# Patient Record
Sex: FEMALE | Race: WHITE | HISPANIC OR LATINO | URBAN - METROPOLITAN AREA
[De-identification: names, ages, dates, MRNs, and addresses within clinical notes are randomized per-mention and may not be internally consistent; named-entity substitution may affect disease eponyms.]

---

## 2020-06-01 ENCOUNTER — TELEPHONE ENCOUNTER (OUTPATIENT)
Dept: URBAN - METROPOLITAN AREA CLINIC 35 | Facility: CLINIC | Age: 40
End: 2020-06-01

## 2020-06-18 ENCOUNTER — TELEPHONE ENCOUNTER (OUTPATIENT)
Dept: URBAN - METROPOLITAN AREA CLINIC 35 | Facility: CLINIC | Age: 40
End: 2020-06-18

## 2020-06-18 ENCOUNTER — OFFICE VISIT (OUTPATIENT)
Dept: URBAN - METROPOLITAN AREA CLINIC 35 | Facility: CLINIC | Age: 40
End: 2020-06-18

## 2020-06-18 VITALS
SYSTOLIC BLOOD PRESSURE: 100 MMHG | DIASTOLIC BLOOD PRESSURE: 68 MMHG | BODY MASS INDEX: 25.03 KG/M2 | HEIGHT: 62 IN | WEIGHT: 136 LBS | TEMPERATURE: 98.6 F

## 2020-06-18 RX ORDER — OMEPRAZOLE 40 MG/1
CAPSULE, DELAYED RELEASE PELLETS ORAL
Status: ACTIVE | COMMUNITY

## 2020-06-18 RX ORDER — DEXTROMETHORPHAN POLISTIREX 30 MG/5 ML
1 CAPSULE SUSPENSION, EXTENDED RELEASE 12 HR ORAL ONCE A DAY
Qty: 30 | Status: ACTIVE | COMMUNITY

## 2020-06-18 RX ORDER — DICYCLOMINE HYDROCHLORIDE 10 MG/1
1 CAPSULE AS NEEDED CAPSULE ORAL THREE TIMES A DAY
Qty: 30 CAPSULE | Refills: 2 | OUTPATIENT
Start: 2020-06-18

## 2020-06-18 RX ORDER — CIPROFLOXACIN HCL 500 MG
1 TABLET TABLET ORAL
Qty: 20 | Status: ON HOLD | COMMUNITY

## 2020-06-18 RX ORDER — SODIUM, POTASSIUM,MAG SULFATES 17.5-3.13G
ML AS DIRECTED SOLUTION, RECONSTITUTED, ORAL ORAL
Qty: 1 KIT | Refills: 0 | OUTPATIENT
Start: 2020-06-18

## 2020-06-18 RX ORDER — METRONIDAZOLE 500 MG/1
1 TABLET TABLET, FILM COATED ORAL THREE TIMES A DAY
Qty: 42 TABLET | Status: ON HOLD | COMMUNITY

## 2020-06-18 NOTE — HPI-MIGRATED HPI
;     Blood in stool :                Patient presents today for a consultation about rectal bleeding.  Bleeding happened one time 2 weeks ago , followed by severe abdominal pain.  The blood was bright in color .  Patient currently admits 1 bowel movements per day.  She denies any mucus, melena, pruritus ani, rectal pain.  Patient denies a previous colonoscopy.  Patient went to her PCP first for evaluation they prescribed Cipro and Flagyl which she finished  ;

## 2020-06-24 ENCOUNTER — OFFICE VISIT (OUTPATIENT)
Dept: URBAN - METROPOLITAN AREA CLINIC 35 | Facility: CLINIC | Age: 40
End: 2020-06-24

## 2020-06-30 ENCOUNTER — OFFICE VISIT (OUTPATIENT)
Dept: URBAN - METROPOLITAN AREA SURGERY CENTER 8 | Facility: SURGERY CENTER | Age: 40
End: 2020-06-30

## 2020-07-10 ENCOUNTER — OFFICE VISIT (OUTPATIENT)
Dept: URBAN - METROPOLITAN AREA CLINIC 35 | Facility: CLINIC | Age: 40
End: 2020-07-10

## 2020-07-10 VITALS
HEART RATE: 87 BPM | HEIGHT: 62 IN | TEMPERATURE: 97.9 F | SYSTOLIC BLOOD PRESSURE: 116 MMHG | WEIGHT: 132 LBS | BODY MASS INDEX: 24.29 KG/M2 | OXYGEN SATURATION: 97 % | DIASTOLIC BLOOD PRESSURE: 72 MMHG

## 2020-07-10 PROBLEM — 92076000 BENIGN NEOPLASM OF DESCENDING COLON: Status: ACTIVE | Noted: 2020-07-10

## 2020-07-10 PROBLEM — 2901004 MELENA: Status: ACTIVE | Noted: 2020-06-18

## 2020-07-10 PROBLEM — 102614006 GENERALIZED ABDOMINAL PAIN: Status: ACTIVE | Noted: 2020-06-19

## 2020-07-10 PROBLEM — 16331000 HEARTBURN: Status: ACTIVE | Noted: 2020-06-19

## 2020-07-10 RX ORDER — CIPROFLOXACIN HCL 500 MG
1 TABLET TABLET ORAL
Qty: 20 | Status: DISCONTINUED | COMMUNITY

## 2020-07-10 RX ORDER — DICYCLOMINE HYDROCHLORIDE 10 MG/1
1 CAPSULE AS NEEDED CAPSULE ORAL THREE TIMES A DAY
Qty: 30 CAPSULE | Refills: 2 | Status: ACTIVE | COMMUNITY
Start: 2020-06-18

## 2020-07-10 RX ORDER — METRONIDAZOLE 500 MG/1
1 TABLET TABLET, FILM COATED ORAL THREE TIMES A DAY
Qty: 42 TABLET | Status: DISCONTINUED | COMMUNITY

## 2020-07-10 RX ORDER — DEXTROMETHORPHAN POLISTIREX 30 MG/5 ML
1 CAPSULE SUSPENSION, EXTENDED RELEASE 12 HR ORAL ONCE A DAY
Qty: 30 | Status: ACTIVE | COMMUNITY

## 2020-07-10 RX ORDER — SODIUM, POTASSIUM,MAG SULFATES 17.5-3.13G
ML AS DIRECTED SOLUTION, RECONSTITUTED, ORAL ORAL
Qty: 1 KIT | Refills: 0 | Status: DISCONTINUED | COMMUNITY
Start: 2020-06-18

## 2020-07-10 RX ORDER — OMEPRAZOLE 40 MG/1
CAPSULE, DELAYED RELEASE PELLETS ORAL
Status: ACTIVE | COMMUNITY

## 2020-07-10 NOTE — HPI-MIGRATED HPI
;     Blood in stool : Patient presents today for follow up to her colonoscopy. Patient currently admits 1 bowel movement per day with  normal and formed stools. Patient denies melena, blood, or mucus in stool.  She admits/denies any more episodes of blood in stool since last visit.        Last visit (6/18/2020)             Patient presents today for a consultation about rectal bleeding.  Bleeding happened one time 2 weeks ago , followed by severe abdominal pain.  The blood was bright in color .  Patient currently admits 1 bowel movements per day.  She denies any mucus, melena, pruritus ani, rectal pain.  Patient denies a previous colonoscopy.  Patient went to her PCP first for evaluation they prescribed Cipro and Flagyl which she finished  ;

## 2020-07-10 NOTE — EXAM-MIGRATED EXAMINATIONS
General Examination : medical assistant was in room.;     GENERAL APPEARANCE: - alert, in no acute distress, well developed, well nourished;

## 2020-07-31 ENCOUNTER — OFFICE VISIT (OUTPATIENT)
Dept: URBAN - METROPOLITAN AREA CLINIC 35 | Facility: CLINIC | Age: 40
End: 2020-07-31

## 2022-04-12 ENCOUNTER — OFFICE VISIT (OUTPATIENT)
Dept: URBAN - METROPOLITAN AREA CLINIC 35 | Facility: CLINIC | Age: 42
End: 2022-04-12
Payer: SELF-PAY

## 2022-04-12 ENCOUNTER — LAB OUTSIDE AN ENCOUNTER (OUTPATIENT)
Dept: URBAN - METROPOLITAN AREA CLINIC 35 | Facility: CLINIC | Age: 42
End: 2022-04-12

## 2022-04-12 VITALS
SYSTOLIC BLOOD PRESSURE: 108 MMHG | BODY MASS INDEX: 22.82 KG/M2 | OXYGEN SATURATION: 98 % | HEIGHT: 62 IN | WEIGHT: 124 LBS | DIASTOLIC BLOOD PRESSURE: 72 MMHG | HEART RATE: 71 BPM

## 2022-04-12 DIAGNOSIS — R10.13 EPIGASTRIC PAIN: ICD-10-CM

## 2022-04-12 PROCEDURE — 99214 OFFICE O/P EST MOD 30 MIN: CPT | Performed by: INTERNAL MEDICINE

## 2022-04-12 RX ORDER — DICYCLOMINE HYDROCHLORIDE 10 MG/1
1 CAPSULE AS NEEDED CAPSULE ORAL THREE TIMES A DAY
Qty: 30 CAPSULE | Refills: 2 | Status: ON HOLD | COMMUNITY
Start: 2020-06-18

## 2022-04-12 RX ORDER — OMEPRAZOLE 40 MG/1
CAPSULE, DELAYED RELEASE PELLETS ORAL
Status: ON HOLD | COMMUNITY

## 2022-04-12 RX ORDER — DEXTROMETHORPHAN POLISTIREX 30 MG/5 ML
1 CAPSULE SUSPENSION, EXTENDED RELEASE 12 HR ORAL ONCE A DAY
Qty: 30 | Status: ON HOLD | COMMUNITY

## 2022-04-12 NOTE — HPI-ABDOMINAL PAIN
42 year old female patient presents for abdominal pain consult. Patient admits she has been experiencing symptoms approximately for one month .  Patient describes symptoms as _if someone is twisting her abdomen .  Patient states symptoms are located epigastric pain that radiates to her lower back .   Patient admits symptoms are more present during /after meals more so spicy foods .  Patient denies nausea or vomiting. Patient denies having any recent imaging.  She denies taking any medications at this time to relieve symptoms . She admits taking Advi at times to relieve symptoms .  Patient denies EGD in the past.

## 2022-05-03 ENCOUNTER — OFFICE VISIT (OUTPATIENT)
Dept: URBAN - METROPOLITAN AREA SURGERY CENTER 8 | Facility: SURGERY CENTER | Age: 42
End: 2022-05-03
Payer: SELF-PAY

## 2022-05-03 ENCOUNTER — CLAIMS CREATED FROM THE CLAIM WINDOW (OUTPATIENT)
Dept: URBAN - METROPOLITAN AREA CLINIC 4 | Facility: CLINIC | Age: 42
End: 2022-05-03
Payer: SELF-PAY

## 2022-05-03 DIAGNOSIS — K29.60 ADENOPAPILLOMATOSIS GASTRICA: ICD-10-CM

## 2022-05-03 DIAGNOSIS — R10.13 ABDOMINAL DISCOMFORT, EPIGASTRIC: ICD-10-CM

## 2022-05-03 DIAGNOSIS — K31.89 ACQUIRED DEFORMITY OF DUODENUM: ICD-10-CM

## 2022-05-03 DIAGNOSIS — B96.81 BACTERIAL INFECTION DUE TO H. PYLORI: ICD-10-CM

## 2022-05-03 DIAGNOSIS — B96.81 HELICOBACTER PYLORI [H. PYLORI] AS THE CAUSE OF DISEASES CLASSIFIED ELSEWHERE: ICD-10-CM

## 2022-05-03 DIAGNOSIS — K29.60 OTHER GASTRITIS WITHOUT BLEEDING: ICD-10-CM

## 2022-05-03 DIAGNOSIS — K31.89 FOCAL FOVEOLAR HYPERPLASIA: ICD-10-CM

## 2022-05-03 PROCEDURE — 88305 TISSUE EXAM BY PATHOLOGIST: CPT | Performed by: PATHOLOGY

## 2022-05-03 PROCEDURE — 88312 SPECIAL STAINS GROUP 1: CPT | Performed by: PATHOLOGY

## 2022-05-03 PROCEDURE — 43239 EGD BIOPSY SINGLE/MULTIPLE: CPT | Performed by: INTERNAL MEDICINE

## 2022-05-03 PROCEDURE — G8907 PT DOC NO EVENTS ON DISCHARG: HCPCS | Performed by: INTERNAL MEDICINE

## 2022-05-03 RX ORDER — DEXTROMETHORPHAN POLISTIREX 30 MG/5 ML
1 CAPSULE SUSPENSION, EXTENDED RELEASE 12 HR ORAL ONCE A DAY
Qty: 30 | Status: ON HOLD | COMMUNITY

## 2022-05-03 RX ORDER — DICYCLOMINE HYDROCHLORIDE 10 MG/1
1 CAPSULE AS NEEDED CAPSULE ORAL THREE TIMES A DAY
Qty: 30 CAPSULE | Refills: 2 | Status: ON HOLD | COMMUNITY
Start: 2020-06-18

## 2022-05-03 RX ORDER — OMEPRAZOLE 40 MG/1
CAPSULE, DELAYED RELEASE PELLETS ORAL
Status: ON HOLD | COMMUNITY

## 2022-05-24 ENCOUNTER — OFFICE VISIT (OUTPATIENT)
Dept: URBAN - METROPOLITAN AREA CLINIC 35 | Facility: CLINIC | Age: 42
End: 2022-05-24

## 2022-06-15 ENCOUNTER — CLAIMS CREATED FROM THE CLAIM WINDOW (OUTPATIENT)
Dept: URBAN - METROPOLITAN AREA CLINIC 33 | Facility: CLINIC | Age: 42
End: 2022-06-15
Payer: SELF-PAY

## 2022-06-15 VITALS — HEIGHT: 62 IN | OXYGEN SATURATION: 98 % | BODY MASS INDEX: 22.08 KG/M2 | WEIGHT: 120 LBS | HEART RATE: 76 BPM

## 2022-06-15 DIAGNOSIS — R10.13 EPIGASTRIC PAIN: ICD-10-CM

## 2022-06-15 DIAGNOSIS — B96.81 HELICOBACTER PYLORI [H. PYLORI] AS THE CAUSE OF DISEASES CLASSIFIED ELSEWHERE: ICD-10-CM

## 2022-06-15 PROBLEM — 79922009: Status: ACTIVE | Noted: 2022-06-15

## 2022-06-15 PROBLEM — 80774000 HELICOBACTER PYLORI: Status: ACTIVE | Noted: 2022-06-15

## 2022-06-15 PROCEDURE — 99214 OFFICE O/P EST MOD 30 MIN: CPT | Performed by: INTERNAL MEDICINE

## 2022-06-15 RX ORDER — OMEPRAZOLE 20 MG/1
1 CAPSULE CAPSULE, DELAYED RELEASE ORAL TWICE A DAY
Qty: 28 CAPSULE | Refills: 0 | OUTPATIENT
Start: 2022-06-15

## 2022-06-15 RX ORDER — DICYCLOMINE HYDROCHLORIDE 10 MG/1
1 CAPSULE AS NEEDED CAPSULE ORAL THREE TIMES A DAY
Qty: 30 CAPSULE | Refills: 2 | Status: ON HOLD | COMMUNITY
Start: 2020-06-18

## 2022-06-15 RX ORDER — DEXTROMETHORPHAN POLISTIREX 30 MG/5 ML
1 CAPSULE SUSPENSION, EXTENDED RELEASE 12 HR ORAL ONCE A DAY
Qty: 30 | Status: ON HOLD | COMMUNITY

## 2022-06-15 RX ORDER — OMEPRAZOLE 40 MG/1
CAPSULE, DELAYED RELEASE PELLETS ORAL
Status: ON HOLD | COMMUNITY

## 2022-06-15 RX ORDER — CLARITHROMYCIN 500 MG/1
1 TABLET TABLET, FILM COATED ORAL TWICE A DAY
Qty: 28 TABLET | Refills: 0 | OUTPATIENT
Start: 2022-06-15 | End: 2022-06-29

## 2022-06-15 RX ORDER — AMOXICILLIN 500 MG/1
2 CAPSULES CAPSULE ORAL TWICE A DAY
Qty: 56 CAPSULE | Refills: 0 | OUTPATIENT
Start: 2022-06-15 | End: 2022-06-29

## 2022-06-15 NOTE — HPI-ABDOMINAL PAIN
Patient presents for follow up of abdominal pain,imaging and EGD. Pt admits she is still havig epigastirc pain after meals . She denies taking any medication at this time . She denies nausea or vomiting . She denies any complications after procedure .   Of last visit (04/12/2022)42 year old female patient presents for abdominal pain consult. Patient admits she has been experiencing symptoms approximately for one month .  Patient describes symptoms as _if someone is twisting her abdomen .  Patient states symptoms are located epigastric pain that radiates to her lower back .   Patient admits symptoms are more present during /after meals more so spicy foods .  Patient denies nausea or vomiting. Patient denies having any recent imaging.  She denies taking any medications at this time to relieve symptoms . She admits taking Advi at times to relieve symptoms .  Patient denies EGD in the past.

## 2022-08-11 ENCOUNTER — OFFICE VISIT (OUTPATIENT)
Dept: URBAN - METROPOLITAN AREA CLINIC 35 | Facility: CLINIC | Age: 42
End: 2022-08-11

## 2022-08-11 ENCOUNTER — TELEPHONE ENCOUNTER (OUTPATIENT)
Dept: URBAN - METROPOLITAN AREA CLINIC 36 | Facility: CLINIC | Age: 42
End: 2022-08-11

## 2022-08-11 RX ORDER — DICYCLOMINE HYDROCHLORIDE 10 MG/1
1 CAPSULE AS NEEDED CAPSULE ORAL THREE TIMES A DAY
Qty: 30 CAPSULE | Refills: 2 | Status: ON HOLD | COMMUNITY
Start: 2020-06-18

## 2022-08-11 RX ORDER — OMEPRAZOLE 20 MG/1
1 CAPSULE CAPSULE, DELAYED RELEASE ORAL TWICE A DAY
Qty: 28 CAPSULE | Refills: 0 | Status: ACTIVE | COMMUNITY
Start: 2022-06-15

## 2022-08-11 RX ORDER — OMEPRAZOLE 40 MG/1
CAPSULE, DELAYED RELEASE PELLETS ORAL
Status: ON HOLD | COMMUNITY

## 2022-08-11 RX ORDER — DEXTROMETHORPHAN POLISTIREX 30 MG/5 ML
1 CAPSULE SUSPENSION, EXTENDED RELEASE 12 HR ORAL ONCE A DAY
Qty: 30 | Status: ON HOLD | COMMUNITY

## 2022-08-17 LAB
H PYLORI BREATH TEST: DETECTED
H. PYLORI BREATH TEST: DETECTED
INTERPRETATION: DETECTED

## 2022-08-23 ENCOUNTER — OFFICE VISIT (OUTPATIENT)
Dept: URBAN - METROPOLITAN AREA CLINIC 35 | Facility: CLINIC | Age: 42
End: 2022-08-23
Payer: SELF-PAY

## 2022-08-23 VITALS
DIASTOLIC BLOOD PRESSURE: 71 MMHG | HEART RATE: 78 BPM | SYSTOLIC BLOOD PRESSURE: 111 MMHG | WEIGHT: 121 LBS | HEIGHT: 62 IN | OXYGEN SATURATION: 98 % | BODY MASS INDEX: 22.26 KG/M2

## 2022-08-23 DIAGNOSIS — B96.81 HELICOBACTER PYLORI [H. PYLORI] AS THE CAUSE OF DISEASES CLASSIFIED ELSEWHERE: ICD-10-CM

## 2022-08-23 PROCEDURE — 99214 OFFICE O/P EST MOD 30 MIN: CPT | Performed by: INTERNAL MEDICINE

## 2022-08-23 RX ORDER — DEXTROMETHORPHAN POLISTIREX 30 MG/5 ML
1 CAPSULE SUSPENSION, EXTENDED RELEASE 12 HR ORAL ONCE A DAY
Qty: 30 | Status: ON HOLD | COMMUNITY

## 2022-08-23 RX ORDER — OMEPRAZOLE 40 MG/1
CAPSULE, DELAYED RELEASE PELLETS ORAL
Status: ON HOLD | COMMUNITY

## 2022-08-23 RX ORDER — OMEPRAZOLE MAGNESIUM, AMOXICILLIN AND RIFABUTIN 10; 250; 12.5 MG/1; MG/1; MG/1
4 CAPSULES CAPSULE, DELAYED RELEASE ORAL
Qty: 168 | OUTPATIENT
Start: 2022-08-23 | End: 2022-09-06

## 2022-08-23 RX ORDER — DICYCLOMINE HYDROCHLORIDE 10 MG/1
1 CAPSULE AS NEEDED CAPSULE ORAL THREE TIMES A DAY
Qty: 30 CAPSULE | Refills: 2 | Status: ON HOLD | COMMUNITY
Start: 2020-06-18

## 2022-08-23 RX ORDER — OMEPRAZOLE 20 MG/1
1 CAPSULE CAPSULE, DELAYED RELEASE ORAL TWICE A DAY
Qty: 28 CAPSULE | Refills: 0 | Status: DISCONTINUED | COMMUNITY
Start: 2022-06-15

## 2022-08-25 ENCOUNTER — TELEPHONE ENCOUNTER (OUTPATIENT)
Dept: URBAN - METROPOLITAN AREA CLINIC 36 | Facility: CLINIC | Age: 42
End: 2022-08-25

## 2022-08-25 RX ORDER — METRONIDAZOLE 500 MG/1
1 TABLET TABLET ORAL
Qty: 56 TABLET | Refills: 0 | OUTPATIENT
Start: 2022-08-25 | End: 2022-09-08

## 2022-08-25 RX ORDER — OMEPRAZOLE 20 MG/1
1 CAPSULE 30 MINUTES BEFORE MEALS CAPSULE, DELAYED RELEASE ORAL TWICE A DAY
Qty: 28 | Refills: 0 | OUTPATIENT
Start: 2022-08-25

## 2022-08-25 RX ORDER — TETRACYCLINE HYDROCHLORIDE 250 MG/1
1 CAPSULE CAPSULE ORAL
Qty: 56 CAPSULE | Refills: 0 | OUTPATIENT
Start: 2022-08-25 | End: 2022-09-08

## 2022-08-29 ENCOUNTER — TELEPHONE ENCOUNTER (OUTPATIENT)
Dept: URBAN - METROPOLITAN AREA CLINIC 36 | Facility: CLINIC | Age: 42
End: 2022-08-29

## 2022-10-10 ENCOUNTER — LAB OUTSIDE AN ENCOUNTER (OUTPATIENT)
Dept: URBAN - METROPOLITAN AREA CLINIC 35 | Facility: CLINIC | Age: 42
End: 2022-10-10

## 2022-10-11 ENCOUNTER — OFFICE VISIT (OUTPATIENT)
Dept: URBAN - METROPOLITAN AREA CLINIC 35 | Facility: CLINIC | Age: 42
End: 2022-10-11

## 2022-10-11 NOTE — HPI-ABDOMINAL PAIN
Patient presents for follow up of abdominal pain. Patient admits she has been experiencing symptoms improved since her last visit  ____. Patient admits symptoms are more present during /after ____. Patient admits /denies nausea or vomiting.      Of last visit (8-)Patient presents for follow up of abdominal pain,imaging and EGD. Pt admits she is still havig epigastirc pain after meals . She denies taking any medication at this time . She denies nausea or vomiting . She denies any complications after procedure .   Of last visit (04/12/2022)42 year old female patient presents for abdominal pain consult. Patient admits she has been experiencing symptoms approximately for one month .  Patient describes symptoms as _if someone is twisting her abdomen .  Patient states symptoms are located epigastric pain that radiates to her lower back .   Patient admits symptoms are more present during /after meals more so spicy foods .  Patient denies nausea or vomiting. Patient denies having any recent imaging.  She denies taking any medications at this time to relieve symptoms . She admits taking Advi at times to relieve symptoms .  Patient denies EGD in the past.

## 2022-10-12 LAB
H PYLORI BREATH TEST: NOT DETECTED
H. PYLORI BREATH TEST: NOT DETECTED
INTERPRETATION: NOT DETECTED

## 2022-10-25 ENCOUNTER — CLAIMS CREATED FROM THE CLAIM WINDOW (OUTPATIENT)
Dept: URBAN - METROPOLITAN AREA CLINIC 35 | Facility: CLINIC | Age: 42
End: 2022-10-25
Payer: SELF-PAY

## 2022-10-25 ENCOUNTER — DASHBOARD ENCOUNTERS (OUTPATIENT)
Age: 42
End: 2022-10-25

## 2022-10-25 VITALS
SYSTOLIC BLOOD PRESSURE: 100 MMHG | BODY MASS INDEX: 23.37 KG/M2 | OXYGEN SATURATION: 98 % | WEIGHT: 127 LBS | DIASTOLIC BLOOD PRESSURE: 64 MMHG | HEART RATE: 75 BPM | HEIGHT: 62 IN

## 2022-10-25 DIAGNOSIS — B96.81 HELICOBACTER PYLORI [H. PYLORI] AS THE CAUSE OF DISEASES CLASSIFIED ELSEWHERE: ICD-10-CM

## 2022-10-25 DIAGNOSIS — D12.4 BENIGN NEOPLASM OF DESCENDING COLON: ICD-10-CM

## 2022-10-25 DIAGNOSIS — R10.84 ABDOMINAL PAIN, GENERALIZED: ICD-10-CM

## 2022-10-25 PROCEDURE — 99214 OFFICE O/P EST MOD 30 MIN: CPT | Performed by: INTERNAL MEDICINE

## 2022-10-25 RX ORDER — DICYCLOMINE HYDROCHLORIDE 20 MG/1
1 TABLET TABLET ORAL THREE TIMES A DAY
Qty: 42 TABLET | Refills: 1 | OUTPATIENT
Start: 2022-10-25 | End: 2022-11-21

## 2022-10-25 RX ORDER — OMEPRAZOLE 20 MG/1
1 CAPSULE 30 MINUTES BEFORE MEALS CAPSULE, DELAYED RELEASE ORAL TWICE A DAY
Qty: 28 | Refills: 0 | Status: ON HOLD | COMMUNITY
Start: 2022-08-25

## 2022-10-25 RX ORDER — OMEPRAZOLE 40 MG/1
CAPSULE, DELAYED RELEASE PELLETS ORAL
Status: ON HOLD | COMMUNITY

## 2022-10-25 RX ORDER — DICYCLOMINE HYDROCHLORIDE 10 MG/1
1 CAPSULE AS NEEDED CAPSULE ORAL THREE TIMES A DAY
Qty: 30 CAPSULE | Refills: 2 | Status: ON HOLD | COMMUNITY
Start: 2020-06-18

## 2022-10-25 RX ORDER — DEXTROMETHORPHAN POLISTIREX 30 MG/5 ML
1 CAPSULE SUSPENSION, EXTENDED RELEASE 12 HR ORAL ONCE A DAY
Qty: 30 | Status: ON HOLD | COMMUNITY

## 2022-10-25 NOTE — HPI-ABDOMINAL PAIN
Patient presents for follow up of abdominal pain and H.Pylori.  She had breath test done on (10.04.2022) with negative results. Patient completed quadruple therapy with good response. Patient states that her symptoms have improved since her last visit. Patient admits symptoms are more present after eating. Patient denies nausea or vomiting.      Of last visit (8-)Patient presents for follow up of abdominal pain,imaging and EGD. Pt admits she is still havig epigastirc pain after meals . She denies taking any medication at this time . She denies nausea or vomiting . She denies any complications after procedure .   Of last visit (04/12/2022)42 year old female patient presents for abdominal pain consult. Patient admits she has been experiencing symptoms approximately for one month .  Patient describes symptoms as _if someone is twisting her abdomen .  Patient states symptoms are located epigastric pain that radiates to her lower back .   Patient admits symptoms are more present during /after meals more so spicy foods .  Patient denies nausea or vomiting. Patient denies having any recent imaging.  She denies taking any medications at this time to relieve symptoms . She admits taking Advi at times to relieve symptoms .  Patient denies EGD in the past.

## 2023-04-25 ENCOUNTER — OFFICE VISIT (OUTPATIENT)
Dept: URBAN - METROPOLITAN AREA CLINIC 35 | Facility: CLINIC | Age: 43
End: 2023-04-25

## 2025-07-22 ENCOUNTER — OFFICE VISIT (OUTPATIENT)
Dept: URBAN - METROPOLITAN AREA CLINIC 35 | Facility: CLINIC | Age: 45
End: 2025-07-22

## 2025-07-22 ENCOUNTER — WEB ENCOUNTER (OUTPATIENT)
Dept: URBAN - METROPOLITAN AREA CLINIC 35 | Facility: CLINIC | Age: 45
End: 2025-07-22

## 2025-07-22 NOTE — HPI-ABDOMINAL PAIN
*** :AST LABS FOUND WAS 2/2024. CHECK WITH PT ** 7/21 @11:04 am  45 year old female patient presents for follow up. She admits/denies improvement. She admits/denies worsening symptoms since ____. Describes sympotms as _______. She admits/denies taking gas x after meals or taking dicyclomine 20mg with ____ relief. Admits/denies nausea or vomiting.  PCP LABS (02/20/2024) CMP: Creatinine - 0.39 L, BUN/Creatinine - 31 H, Calcium - 8.5 L, All other values WNL Lipid Panel: HDL - 44 L, Triglycerides - 197 H, LDL - 120 H, Non HDL - 152 H, All other values WNL A1c - 5.8 H TSH - WNL  Of last visit (10/25/2022) Patient presents for follow up of abdominal pain and H.Pylori.  She had breath test done on (10.04.2022) with negative results. Patient completed quadruple therapy with good response. Patient states that her symptoms have improved since her last visit. Patient admits symptoms are more present after eating. Patient denies nausea or vomiting.   Of last visit (8-)Patient presents for follow up of abdominal pain,imaging and EGD. Pt admits she is still havig epigastirc pain after meals . She denies taking any medication at this time . She denies nausea or vomiting . She denies any complications after procedure .   Of last visit (04/12/2022)42 year old female patient presents for abdominal pain consult. Patient admits she has been experiencing symptoms approximately for one month .  Patient describes symptoms as _if someone is twisting her abdomen .  Patient states symptoms are located epigastric pain that radiates to her lower back .   Patient admits symptoms are more present during /after meals more so spicy foods .  Patient denies nausea or vomiting. Patient denies having any recent imaging.  She denies taking any medications at this time to relieve symptoms . She admits taking Advi at times to relieve symptoms .  Patient denies EGD in the past.

## 2025-07-22 NOTE — HPI-SURVEILLANCE COLONOSCOPY
Patient presents today for surveillance colonoscopy consult. 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 She admits a personal history of colon polyps. Admits/denies a family history of colon polyps or colon cancer. Currently, admits/denies normal bowel habits, with ___ bowel movements a day. Admits/Denies any current symptoms of rectal bleeding, melena or mucus.

## 2025-07-25 ENCOUNTER — LAB OUTSIDE AN ENCOUNTER (OUTPATIENT)
Dept: URBAN - METROPOLITAN AREA CLINIC 35 | Facility: CLINIC | Age: 45
End: 2025-07-25

## 2025-07-25 ENCOUNTER — OFFICE VISIT (OUTPATIENT)
Dept: URBAN - METROPOLITAN AREA CLINIC 35 | Facility: CLINIC | Age: 45
End: 2025-07-25
Payer: SELF-PAY

## 2025-07-25 DIAGNOSIS — Z86.0100 PERSONAL HISTORY OF COLON POLYPS, UNSPECIFIED: ICD-10-CM

## 2025-07-25 DIAGNOSIS — R10.84 GENERALIZED ABDOMINAL PAIN: ICD-10-CM

## 2025-07-25 DIAGNOSIS — R10.13 EPIGASTRIC PAIN: ICD-10-CM

## 2025-07-25 DIAGNOSIS — K59.09 CHRONIC CONSTIPATION: ICD-10-CM

## 2025-07-25 PROBLEM — 236069009: Status: ACTIVE | Noted: 2025-07-25

## 2025-07-25 PROBLEM — 721730009: Status: ACTIVE | Noted: 2025-07-25

## 2025-07-25 PROBLEM — 102614006: Status: ACTIVE | Noted: 2025-07-25

## 2025-07-25 PROBLEM — 428283002: Status: ACTIVE | Noted: 2025-07-25

## 2025-07-25 PROCEDURE — 99214 OFFICE O/P EST MOD 30 MIN: CPT | Performed by: INTERNAL MEDICINE

## 2025-07-25 RX ORDER — DICYCLOMINE HYDROCHLORIDE 20 MG/1
1 TABLET TABLET ORAL THREE TIMES A DAY
Qty: 90 | Refills: 1 | OUTPATIENT
Start: 2025-07-25

## 2025-07-25 RX ORDER — SODIUM, POTASSIUM,MAG SULFATES 17.5-3.13G
AS DIRECTED SOLUTION, RECONSTITUTED, ORAL ORAL
Qty: 1 | Refills: 0 | OUTPATIENT
Start: 2025-07-25 | End: 2025-07-27

## 2025-07-25 NOTE — HPI-SURVEILLANCE COLONOSCOPY
Patient presents today for surveillance colonoscopy consult. 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 She admits a personal history of colon polyps. She denies a family history of colon polyps or colon cancer. Currently, denies normal bowel habits, with 1 bowel movement every other day, sometimes every 2 days. Denies any current symptoms of rectal bleeding, melena or mucus. Yes

## 2025-07-25 NOTE — HPI-ABDOMINAL PAIN
*** LAST LABS FOUND WAS 2/2024. CHECK WITH PT ** 7/21 @11:04 am *** SPOKE TO PT. SHE HAS NOT HAD ANY BLOOD WORK OR IMAGING DONE *** 7/24 @1:38 pm  45 year old female patient presents for follow up. She admits/denies improvement. She admits worsening symptoms since June. Describes sympotms as severe sharp abdominal pain. Pt admits pain after each meal and pain starts in mid abdomen and then turns into back pain as well. She denies taking gas x after meals or taking dicyclomine 20mg. Denies nausea or vomiting.   PCP LABS (02/20/2024) CMP: Creatinine - 0.39 L, BUN/Creatinine - 31 H, Calcium - 8.5 L, All other values WNL Lipid Panel: HDL - 44 L, Triglycerides - 197 H, LDL - 120 H, Non HDL - 152 H, All other values WNL A1c - 5.8 H TSH - WNL  Of last visit (10/25/2022) Patient presents for follow up of abdominal pain and H.Pylori.  She had breath test done on (10.04.2022) with negative results. Patient completed quadruple therapy with good response. Patient states that her symptoms have improved since her last visit. Patient admits symptoms are more present after eating. Patient denies nausea or vomiting.   Of last visit (8-)Patient presents for follow up of abdominal pain,imaging and EGD. Pt admits she is still havig epigastirc pain after meals . She denies taking any medication at this time . She denies nausea or vomiting . She denies any complications after procedure .   Of last visit (04/12/2022)42 year old female patient presents for abdominal pain consult. Patient admits she has been experiencing symptoms approximately for one month .  Patient describes symptoms as _if someone is twisting her abdomen .  Patient states symptoms are located epigastric pain that radiates to her lower back .   Patient admits symptoms are more present during /after meals more so spicy foods .  Patient denies nausea or vomiting. Patient denies having any recent imaging.  She denies taking any medications at this time to relieve symptoms . She admits taking Advi at times to relieve symptoms .  Patient denies EGD in the past.

## 2025-08-13 ENCOUNTER — TELEPHONE ENCOUNTER (OUTPATIENT)
Dept: URBAN - METROPOLITAN AREA CLINIC 35 | Facility: CLINIC | Age: 45
End: 2025-08-13

## 2025-08-19 ENCOUNTER — CLAIMS CREATED FROM THE CLAIM WINDOW (OUTPATIENT)
Dept: URBAN - METROPOLITAN AREA SURGERY CENTER 8 | Facility: SURGERY CENTER | Age: 45
End: 2025-08-19

## 2025-08-19 ENCOUNTER — OFFICE VISIT (OUTPATIENT)
Dept: URBAN - METROPOLITAN AREA SURGERY CENTER 8 | Facility: SURGERY CENTER | Age: 45
End: 2025-08-19
Payer: SELF-PAY

## 2025-08-19 ENCOUNTER — CLAIMS CREATED FROM THE CLAIM WINDOW (OUTPATIENT)
Dept: URBAN - METROPOLITAN AREA CLINIC 4 | Facility: CLINIC | Age: 45
End: 2025-08-19
Payer: SELF-PAY

## 2025-08-19 DIAGNOSIS — D12.3 ADENOMA OF TRANSVERSE COLON: ICD-10-CM

## 2025-08-19 DIAGNOSIS — Z86.0100 PERSONAL HISTORY OF COLON POLYPS, UNSPECIFIED: ICD-10-CM

## 2025-08-19 DIAGNOSIS — D12.3 BENIGN NEOPLASM OF TRANSVERSE COLON: ICD-10-CM

## 2025-08-19 PROBLEM — 441773004: Status: ACTIVE | Noted: 2025-08-19

## 2025-08-19 PROCEDURE — 0529F INTRVL 3/>YR PTS CLNSCP DOCD: CPT | Performed by: INTERNAL MEDICINE

## 2025-08-19 PROCEDURE — 45385 COLONOSCOPY W/LESION REMOVAL: CPT | Performed by: INTERNAL MEDICINE

## 2025-08-19 PROCEDURE — 88305 TISSUE EXAM BY PATHOLOGIST: CPT | Performed by: PATHOLOGY

## 2025-08-19 RX ORDER — DICYCLOMINE HYDROCHLORIDE 20 MG/1
1 TABLET TABLET ORAL THREE TIMES A DAY
Qty: 90 | Refills: 1 | Status: ACTIVE | COMMUNITY
Start: 2025-07-25

## 2025-08-19 RX ORDER — METRONIDAZOLE 250 MG/1
1 TABLET TABLET ORAL
Qty: 56 TABLET | Refills: 0 | OUTPATIENT
Start: 2025-08-19 | End: 2025-09-02

## 2025-08-19 RX ORDER — TETRACYCLINE HYDROCHLORIDE 500 MG/1
1 CAPSULE CAPSULE ORAL
Qty: 56 CAPSULE | Refills: 0 | OUTPATIENT
Start: 2025-08-19 | End: 2025-09-02

## 2025-08-19 RX ORDER — OMEPRAZOLE 20 MG/1
1 CAPSULE CAPSULE, DELAYED RELEASE ORAL TWICE A DAY
Qty: 28 CAPSULE | Refills: 0 | OUTPATIENT
Start: 2025-08-19